# Patient Record
Sex: MALE | Race: WHITE | HISPANIC OR LATINO | Employment: UNEMPLOYED | URBAN - METROPOLITAN AREA
[De-identification: names, ages, dates, MRNs, and addresses within clinical notes are randomized per-mention and may not be internally consistent; named-entity substitution may affect disease eponyms.]

---

## 2017-02-28 ENCOUNTER — ALLSCRIPTS OFFICE VISIT (OUTPATIENT)
Dept: OTHER | Facility: OTHER | Age: 42
End: 2017-02-28

## 2017-02-28 DIAGNOSIS — E78.2 MIXED HYPERLIPIDEMIA: ICD-10-CM

## 2017-03-04 ENCOUNTER — APPOINTMENT (OUTPATIENT)
Dept: LAB | Facility: OTHER | Age: 42
End: 2017-03-04
Payer: COMMERCIAL

## 2017-03-04 ENCOUNTER — TRANSCRIBE ORDERS (OUTPATIENT)
Dept: LAB | Facility: OTHER | Age: 42
End: 2017-03-04

## 2017-03-04 DIAGNOSIS — E78.2 MIXED HYPERLIPIDEMIA: ICD-10-CM

## 2017-03-04 LAB
ALBUMIN SERPL BCP-MCNC: 3.6 G/DL (ref 3.5–5)
ALP SERPL-CCNC: 76 U/L (ref 46–116)
ALT SERPL W P-5'-P-CCNC: 51 U/L (ref 12–78)
ANION GAP SERPL CALCULATED.3IONS-SCNC: 7 MMOL/L (ref 4–13)
AST SERPL W P-5'-P-CCNC: 18 U/L (ref 5–45)
BILIRUB SERPL-MCNC: 0.51 MG/DL (ref 0.2–1)
BUN SERPL-MCNC: 13 MG/DL (ref 5–25)
CALCIUM SERPL-MCNC: 8.6 MG/DL (ref 8.3–10.1)
CHLORIDE SERPL-SCNC: 107 MMOL/L (ref 100–108)
CHOLEST SERPL-MCNC: 177 MG/DL (ref 50–200)
CO2 SERPL-SCNC: 29 MMOL/L (ref 21–32)
CREAT SERPL-MCNC: 0.9 MG/DL (ref 0.6–1.3)
GFR SERPL CREATININE-BSD FRML MDRD: >60 ML/MIN/1.73SQ M
GLUCOSE SERPL-MCNC: 101 MG/DL (ref 65–140)
HDLC SERPL-MCNC: 48 MG/DL (ref 40–60)
LDLC SERPL CALC-MCNC: 96 MG/DL (ref 0–100)
POTASSIUM SERPL-SCNC: 4.1 MMOL/L (ref 3.5–5.3)
PROT SERPL-MCNC: 7 G/DL (ref 6.4–8.2)
SODIUM SERPL-SCNC: 143 MMOL/L (ref 136–145)
TRIGL SERPL-MCNC: 166 MG/DL

## 2017-03-04 PROCEDURE — 80061 LIPID PANEL: CPT

## 2017-03-04 PROCEDURE — 80053 COMPREHEN METABOLIC PANEL: CPT

## 2017-03-04 PROCEDURE — 36415 COLL VENOUS BLD VENIPUNCTURE: CPT

## 2017-03-06 ENCOUNTER — GENERIC CONVERSION - ENCOUNTER (OUTPATIENT)
Dept: OTHER | Facility: OTHER | Age: 42
End: 2017-03-06

## 2017-05-12 ENCOUNTER — ALLSCRIPTS OFFICE VISIT (OUTPATIENT)
Dept: OTHER | Facility: OTHER | Age: 42
End: 2017-05-12

## 2017-10-17 DIAGNOSIS — E78.2 MIXED HYPERLIPIDEMIA: ICD-10-CM

## 2017-10-17 DIAGNOSIS — E78.00 PURE HYPERCHOLESTEROLEMIA: ICD-10-CM

## 2017-11-04 ENCOUNTER — TRANSCRIBE ORDERS (OUTPATIENT)
Dept: LAB | Facility: OTHER | Age: 42
End: 2017-11-04

## 2017-11-04 ENCOUNTER — APPOINTMENT (OUTPATIENT)
Dept: LAB | Facility: OTHER | Age: 42
End: 2017-11-04
Payer: COMMERCIAL

## 2017-11-04 DIAGNOSIS — E78.2 MIXED HYPERLIPIDEMIA: ICD-10-CM

## 2017-11-04 LAB
ALBUMIN SERPL BCP-MCNC: 3.7 G/DL (ref 3.5–5)
ALP SERPL-CCNC: 70 U/L (ref 46–116)
ALT SERPL W P-5'-P-CCNC: 47 U/L (ref 12–78)
ANION GAP SERPL CALCULATED.3IONS-SCNC: 5 MMOL/L (ref 4–13)
AST SERPL W P-5'-P-CCNC: 20 U/L (ref 5–45)
BILIRUB SERPL-MCNC: 0.4 MG/DL (ref 0.2–1)
BUN SERPL-MCNC: 16 MG/DL (ref 5–25)
CALCIUM SERPL-MCNC: 8.7 MG/DL (ref 8.3–10.1)
CHLORIDE SERPL-SCNC: 107 MMOL/L (ref 100–108)
CHOLEST SERPL-MCNC: 155 MG/DL (ref 50–200)
CO2 SERPL-SCNC: 29 MMOL/L (ref 21–32)
CREAT SERPL-MCNC: 0.99 MG/DL (ref 0.6–1.3)
GFR SERPL CREATININE-BSD FRML MDRD: 94 ML/MIN/1.73SQ M
GLUCOSE P FAST SERPL-MCNC: 101 MG/DL (ref 65–99)
HDLC SERPL-MCNC: 42 MG/DL (ref 40–60)
LDLC SERPL CALC-MCNC: 85 MG/DL (ref 0–100)
POTASSIUM SERPL-SCNC: 4.2 MMOL/L (ref 3.5–5.3)
PROT SERPL-MCNC: 6.9 G/DL (ref 6.4–8.2)
SODIUM SERPL-SCNC: 141 MMOL/L (ref 136–145)
TRIGL SERPL-MCNC: 141 MG/DL

## 2017-11-04 PROCEDURE — 80053 COMPREHEN METABOLIC PANEL: CPT

## 2017-11-04 PROCEDURE — 80061 LIPID PANEL: CPT

## 2017-11-04 PROCEDURE — 36415 COLL VENOUS BLD VENIPUNCTURE: CPT

## 2017-11-14 ENCOUNTER — ALLSCRIPTS OFFICE VISIT (OUTPATIENT)
Dept: OTHER | Facility: OTHER | Age: 42
End: 2017-11-14
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
## 2018-01-11 NOTE — RESULT NOTES
Verified Results  (1) COMPREHENSIVE METABOLIC PANEL 62XAK1045 93:15KY Manoj Jasso Order Number: [id number]     Test Name Result Flag Reference   GLUCOSE,RANDM 101 mg/dL     If the patient is fasting, the ADA then defines impaired fasting glucose as > 100 mg/dL and diabetes as > or equal to 123 mg/dL  SODIUM 143 mmol/L  136-145   POTASSIUM 4 1 mmol/L  3 5-5 3   CHLORIDE 107 mmol/L  100-108   CARBON DIOXIDE 29 mmol/L  21-32   ANION GAP (CALC) 7 mmol/L  4-13   BLOOD UREA NITROGEN 13 mg/dL  5-25   CREATININE 0 90 mg/dL  0 60-1 30   Standardized to IDMS reference method   CALCIUM 8 6 mg/dL  8 3-10 1   BILI, TOTAL 0 51 mg/dL  0 20-1 00   ALK PHOSPHATAS 76 U/L     ALT (SGPT) 51 U/L  12-78   AST(SGOT) 18 U/L  5-45   ALBUMIN 3 6 g/dL  3 5-5 0   TOTAL PROTEIN 7 0 g/dL  6 4-8 2   eGFR Non-African American      >60 0 ml/min/1 73sq m   - Patient Instructions: This is a fasting blood test  Water,black tea or black  coffee only after 9:00pm the night before test Drink 2 glasses of water the morning of test   National Kidney Disease Education Program recommendations are as follows:  GFR calculation is accurate only with a steady state creatinine  Chronic Kidney disease less than 60 ml/min/1 73 sq  meters  Kidney failure less than 15 ml/min/1 73 sq  meters  (1) LIPID PANEL, FASTING 87ECH0982 07:30AM Manoj Jasso Order Number: [id number]     Test Name Result Flag Reference   CHOLESTEROL 177 mg/dL     HDL,DIRECT 48 mg/dL  40-60   Specimen collection should occur prior to Metamizole administration due to the potential for falsely depressed results  LDL CHOLESTEROL CALCULATED 96 mg/dL  0-100   - Patient Instructions: This is a fasting blood test  Water,black tea or black  coffee only after 9:00pm the night before test   Drink 2 glasses of water the morning of test     - Patient Instructions:  This is a fasting blood test  Water,black tea or black  coffee only after 9:00pm the night before test Drink 2 glasses of water the morning of test   Triglyceride:         Normal              <150 mg/dl       Borderline High    150-199 mg/dl       High               200-499 mg/dl       Very High          >499 mg/dl  Cholesterol:         Desirable        <200 mg/dl      Borderline High  200-239 mg/dl      High             >239 mg/dl  HDL Cholesterol:        High    >59 mg/dL      Low     <41 mg/dL  LDL CALCULATED:    This screening LDL is a calculated result  It does not have the accuracy of the Direct Measured LDL in the monitoring of patients with hyperlipidemia and/or statin therapy  Direct Measure LDL (TCM530) must be ordered separately in these patients  TRIGLYCERIDES 166 mg/dL H <=150   Specimen collection should occur prior to N-Acetylcysteine or Metamizole administration due to the potential for falsely depressed results
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
## 2018-01-13 VITALS
BODY MASS INDEX: 34.21 KG/M2 | SYSTOLIC BLOOD PRESSURE: 112 MMHG | HEART RATE: 91 BPM | WEIGHT: 218 LBS | OXYGEN SATURATION: 98 % | DIASTOLIC BLOOD PRESSURE: 80 MMHG | HEIGHT: 67 IN

## 2018-01-13 VITALS
OXYGEN SATURATION: 96 % | WEIGHT: 215 LBS | SYSTOLIC BLOOD PRESSURE: 120 MMHG | HEIGHT: 67 IN | DIASTOLIC BLOOD PRESSURE: 86 MMHG | BODY MASS INDEX: 33.74 KG/M2 | HEART RATE: 98 BPM

## 2018-01-13 VITALS
HEART RATE: 93 BPM | OXYGEN SATURATION: 98 % | HEIGHT: 67 IN | BODY MASS INDEX: 33.43 KG/M2 | DIASTOLIC BLOOD PRESSURE: 86 MMHG | SYSTOLIC BLOOD PRESSURE: 122 MMHG | WEIGHT: 213 LBS

## 2018-01-13 NOTE — RESULT NOTES
Message   call, elevated triglycerides, low fat low cholesterol diet     Verified Results  (1) COMPREHENSIVE METABOLIC PANEL [id number] 89:99TR Elena Martinez Order Number: [id number]      National Kidney Disease Education Program recommendations are as follows:  GFR calculation is accurate only with a steady state creatinine  Chronic Kidney disease less than 60 ml/min/1 73 sq  meters  Kidney failure less than 15 ml/min/1 73 sq  meters  Test Name Result Flag Reference   GLUCOSE,RANDM 95 mg/dL     If the patient is fasting, the ADA then defines impaired fasting glucose as > 100 mg/dL and diabetes as > or equal to 123 mg/dL     SODIUM 141 mmol/L  136-145   POTASSIUM 4 3 mmol/L  3 5-5 3   CHLORIDE 108 mmol/L  100-108   CARBON DIOXIDE 26 mmol/L  21-32   ANION GAP (CALC) 7 mmol/L  4-13   BLOOD UREA NITROGEN 16 mg/dL  5-25   CREATININE 0 94 mg/dL  0 60-1 30   Standardized to IDMS reference method   CALCIUM 8 7 mg/dL  8 3-10 1   BILI, TOTAL 0 34 mg/dL  0 20-1 00   ALK PHOSPHATAS 80 U/L     ALT (SGPT) 47 U/L  12-78   AST(SGOT) 18 U/L  5-45   ALBUMIN 3 8 g/dL  3 5-5 0   TOTAL PROTEIN 7 1 g/dL  6 4-8 2   eGFR Non-African American      >60 0 ml/min/1 73sq m     (1) LIPID PANEL, FASTING 09Apr2016 09:40AM Elena Martinez Order Number: [id number]      Triglyceride:         Normal              <150 mg/dl       Borderline High    150-199 mg/dl       High               200-499 mg/dl       Very High          >499 mg/dl  Cholesterol:         Desirable        <200 mg/dl      Borderline High  200-239 mg/dl      High             >239 mg/dl  HDL Cholesterol:        High    >59 mg/dL      Low     <41 mg/dL     Test Name Result Flag Reference   CHOLESTEROL 184 mg/dL     HDL,DIRECT 40 mg/dL  40-60   LDL CHOLESTEROL CALCULATED 99 mg/dL  0-100   TRIGLYCERIDES 223 mg/dL H <=150

## 2018-01-15 NOTE — MISCELLANEOUS
Dear Danette Murphy : We missed you for your originally scheduled appointment for a  test ordered by Dr Mona Paulino  Please call at your earliest convenience to reschedule this appointment      Sincerely,  Bill Ramsay       Extension: [phone]     cc:  Dr Mona Paulino      Electronically signed by:Hazel Lindo   Mar  3 2016  5:44PM EST Co-author

## 2018-01-27 DIAGNOSIS — E78.2 MIXED HYPERLIPIDEMIA: Primary | ICD-10-CM

## 2018-01-28 RX ORDER — ATORVASTATIN CALCIUM 10 MG/1
TABLET, FILM COATED ORAL
Qty: 90 TABLET | Refills: 1 | Status: SHIPPED | OUTPATIENT
Start: 2018-01-28 | End: 2018-01-30 | Stop reason: SDUPTHER

## 2018-01-30 DIAGNOSIS — E78.2 MIXED HYPERLIPIDEMIA: ICD-10-CM

## 2018-01-30 RX ORDER — ATORVASTATIN CALCIUM 10 MG/1
TABLET, FILM COATED ORAL
Qty: 90 TABLET | Refills: 1 | Status: SHIPPED | OUTPATIENT
Start: 2018-01-30 | End: 2018-01-31 | Stop reason: SDUPTHER

## 2018-01-31 ENCOUNTER — TELEPHONE (OUTPATIENT)
Dept: FAMILY MEDICINE CLINIC | Facility: CLINIC | Age: 43
End: 2018-01-31

## 2018-01-31 DIAGNOSIS — E78.2 MIXED HYPERLIPIDEMIA: ICD-10-CM

## 2018-01-31 RX ORDER — ATORVASTATIN CALCIUM 10 MG/1
10 TABLET, FILM COATED ORAL DAILY
Qty: 90 TABLET | Refills: 3 | Status: SHIPPED | OUTPATIENT
Start: 2018-01-31 | End: 2019-08-16 | Stop reason: SDUPTHER

## 2018-02-08 ENCOUNTER — TRANSCRIBE ORDERS (OUTPATIENT)
Dept: MRI IMAGING | Facility: CLINIC | Age: 43
End: 2018-02-08

## 2018-02-08 DIAGNOSIS — J32.0 CHRONIC MAXILLARY SINUSITIS: Primary | ICD-10-CM

## 2018-02-08 DIAGNOSIS — J34.2 DEVIATED NASAL SEPTUM: ICD-10-CM

## 2018-02-12 ENCOUNTER — HOSPITAL ENCOUNTER (OUTPATIENT)
Dept: CT IMAGING | Facility: CLINIC | Age: 43
Discharge: HOME | End: 2018-02-12
Payer: COMMERCIAL

## 2018-02-12 DIAGNOSIS — J34.2 DEVIATED NASAL SEPTUM: ICD-10-CM

## 2018-02-12 DIAGNOSIS — J32.0 CHRONIC MAXILLARY SINUSITIS: ICD-10-CM

## 2018-02-12 PROCEDURE — 70486 CT MAXILLOFACIAL W/O DYE: CPT

## 2018-09-19 ENCOUNTER — TELEPHONE (OUTPATIENT)
Dept: FAMILY MEDICINE CLINIC | Facility: CLINIC | Age: 43
End: 2018-09-19

## 2018-09-19 DIAGNOSIS — K21.9 GASTROESOPHAGEAL REFLUX DISEASE WITHOUT ESOPHAGITIS: Primary | ICD-10-CM

## 2018-09-19 RX ORDER — OMEPRAZOLE 40 MG/1
40 CAPSULE, DELAYED RELEASE ORAL DAILY
Qty: 90 CAPSULE | Refills: 1 | Status: SHIPPED | OUTPATIENT
Start: 2018-09-19 | End: 2019-07-28 | Stop reason: SDUPTHER

## 2018-09-19 RX ORDER — OMEPRAZOLE 40 MG/1
1 CAPSULE, DELAYED RELEASE ORAL DAILY
COMMUNITY
Start: 2014-11-30 | End: 2018-09-19 | Stop reason: SDUPTHER

## 2018-10-06 ENCOUNTER — OFFICE VISIT (OUTPATIENT)
Dept: URGENT CARE | Facility: CLINIC | Age: 43
End: 2018-10-06
Payer: COMMERCIAL

## 2018-10-06 VITALS
HEIGHT: 67 IN | HEART RATE: 89 BPM | DIASTOLIC BLOOD PRESSURE: 74 MMHG | WEIGHT: 228 LBS | SYSTOLIC BLOOD PRESSURE: 118 MMHG | BODY MASS INDEX: 35.79 KG/M2 | TEMPERATURE: 98.7 F | RESPIRATION RATE: 18 BRPM | OXYGEN SATURATION: 98 %

## 2018-10-06 DIAGNOSIS — J01.01 ACUTE RECURRENT MAXILLARY SINUSITIS: Primary | ICD-10-CM

## 2018-10-06 PROCEDURE — G0382 LEV 3 HOSP TYPE B ED VISIT: HCPCS | Performed by: PHYSICIAN ASSISTANT

## 2018-10-06 RX ORDER — AMOXICILLIN AND CLAVULANATE POTASSIUM 875; 125 MG/1; MG/1
1 TABLET, FILM COATED ORAL EVERY 12 HOURS SCHEDULED
Qty: 20 TABLET | Refills: 0 | Status: SHIPPED | OUTPATIENT
Start: 2018-10-06 | End: 2018-10-16

## 2018-10-06 NOTE — PATIENT INSTRUCTIONS
Augmentin prescription sent to pharmacy-use as directed  If you developed a rash, tongue or throat swelling immediately start medication and proceed to the emergency room in severe cases  Pseudoephedrine as needed for congestion  Tylenol/ibuprofen as needed for fever or pain  Follow up with PCP in 3-5 days  Proceed to  ER if symptoms worsen  Sinusitis   AMBULATORY CARE:   Sinusitis  is inflammation or infection of your sinuses  It is most often caused by a virus  Acute sinusitis may last up to 12 weeks  Chronic sinusitis lasts longer than 12 weeks  Recurrent sinusitis means you have 4 or more times in 1 year  Common symptoms include the following:   · Fever    · Pain, pressure, redness, or swelling around the forehead, cheeks, or eyes    · Thick yellow or green discharge from your nose    · Tenderness when you touch your face over your sinuses    · Dry cough that happens mostly at night or when you lie down    · Headache and face pain that is worse when you lean forward    · Tooth pain, or pain when you chew  Seek care immediately if:   · Your eye and eyelid are red, swollen, and painful  · You cannot open your eye  · You have vision changes, such as double vision  · Your eyeball bulges out or you cannot move your eye  · You are more sleepy than normal, or you notice changes in your ability to think, move, or talk  · You have a stiff neck, a fever, or a bad headache  · You have swelling of your forehead or scalp  Contact your healthcare provider if:   · Your symptoms do not improve after 3 days  · Your symptoms do not go away after 10 days  · You have nausea and are vomiting  · Your nose is bleeding  · You have questions or concerns about your condition or care  Treatment for sinusitis:  Your symptoms may go away on their own  Your healthcare provider may recommend watchful waiting for up to 10 days before starting antibiotics   You may  need any of the following:  · Acetaminophen  decreases pain and fever  It is available without a doctor's order  Ask how much to take and how often to take it  Follow directions  Read the labels of all other medicines you are using to see if they also contain acetaminophen, or ask your doctor or pharmacist  Acetaminophen can cause liver damage if not taken correctly  Do not use more than 4 grams (4,000 milligrams) total of acetaminophen in one day  · NSAIDs , such as ibuprofen, help decrease swelling, pain, and fever  This medicine is available with or without a doctor's order  NSAIDs can cause stomach bleeding or kidney problems in certain people  If you take blood thinner medicine, always ask your healthcare provider if NSAIDs are safe for you  Always read the medicine label and follow directions  · Nasal steroid sprays  may help decrease inflammation in your nose and sinuses  · Decongestants  help reduce swelling and drain mucus in the nose and sinuses  They may help you breathe easier  · Antihistamines  help dry mucus in the nose and relieve sneezing  · Antibiotics  help treat or prevent a bacterial infection  · Take your medicine as directed  Contact your healthcare provider if you think your medicine is not helping or if you have side effects  Tell him or her if you are allergic to any medicine  Keep a list of the medicines, vitamins, and herbs you take  Include the amounts, and when and why you take them  Bring the list or the pill bottles to follow-up visits  Carry your medicine list with you in case of an emergency  Self-care:   · Rinse your sinuses  Use a sinus rinse device to rinse your nasal passages with a saline (salt water) solution or distilled water  Do not use tap water  This will help thin the mucus in your nose and rinse away pollen and dirt  It will also help reduce swelling so you can breathe normally  Ask your healthcare provider how often to do this       · Breathe in steam   Heat a bowl of water until you see steam  Lean over the bowl and make a tent over your head with a large towel  Breathe deeply for about 20 minutes  Be careful not to get too close to the steam or burn yourself  Do this 3 times a day  You can also breathe deeply when you take a hot shower  · Sleep with your head elevated  Place an extra pillow under your head before you go to sleep to help your sinuses drain  · Drink liquids as directed  Ask your healthcare provider how much liquid to drink each day and which liquids are best for you  Liquids will thin the mucus in your nose and help it drain  Avoid drinks that contain alcohol or caffeine  · Do not smoke, and avoid secondhand smoke  Nicotine and other chemicals in cigarettes and cigars can make your symptoms worse  Ask your healthcare provider for information if you currently smoke and need help to quit  E-cigarettes or smokeless tobacco still contain nicotine  Talk to your healthcare provider before you use these products  Prevent the spread of germs that cause sinusitis:  Wash your hands often with soap and water  Wash your hands after you use the bathroom, change a child's diaper, or sneeze  Wash your hands before you prepare or eat food  Follow up with your healthcare provider as directed: You may be referred to an ear, nose, and throat specialist  Write down your questions so you remember to ask them during your visits  © 2017 [address] Information is for End User's use only and may not be sold, redistributed or otherwise used for commercial purposes  All illustrations and images included in CareNotes® are the copyrighted property of [employer]  or [employer]  The above information is an  only  It is not intended as medical advice for individual conditions or treatments  Talk to your doctor, nurse or pharmacist before following any medical regimen to see if it is safe and effective for you
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
## 2018-11-23 ENCOUNTER — OFFICE VISIT (OUTPATIENT)
Dept: URGENT CARE | Facility: CLINIC | Age: 43
End: 2018-11-23
Payer: COMMERCIAL

## 2018-11-23 VITALS
TEMPERATURE: 98 F | OXYGEN SATURATION: 97 % | DIASTOLIC BLOOD PRESSURE: 86 MMHG | SYSTOLIC BLOOD PRESSURE: 118 MMHG | BODY MASS INDEX: 35.16 KG/M2 | RESPIRATION RATE: 16 BRPM | WEIGHT: 224 LBS | HEIGHT: 67 IN | HEART RATE: 94 BPM

## 2018-11-23 DIAGNOSIS — J01.00 ACUTE NON-RECURRENT MAXILLARY SINUSITIS: Primary | ICD-10-CM

## 2018-11-23 PROCEDURE — 99283 EMERGENCY DEPT VISIT LOW MDM: CPT | Performed by: PHYSICIAN ASSISTANT

## 2018-11-23 PROCEDURE — G0382 LEV 3 HOSP TYPE B ED VISIT: HCPCS | Performed by: PHYSICIAN ASSISTANT

## 2018-11-23 RX ORDER — AMOXICILLIN AND CLAVULANATE POTASSIUM 500; 125 MG/1; MG/1
1 TABLET, FILM COATED ORAL EVERY 8 HOURS SCHEDULED
Qty: 30 TABLET | Refills: 0 | Status: SHIPPED | OUTPATIENT
Start: 2018-11-23 | End: 2018-12-03

## 2018-11-23 NOTE — PATIENT INSTRUCTIONS
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
## 2018-12-27 ENCOUNTER — OFFICE VISIT (OUTPATIENT)
Dept: FAMILY MEDICINE CLINIC | Facility: CLINIC | Age: 43
End: 2018-12-27
Payer: COMMERCIAL

## 2018-12-27 VITALS
RESPIRATION RATE: 18 BRPM | BODY MASS INDEX: 35 KG/M2 | WEIGHT: 223 LBS | SYSTOLIC BLOOD PRESSURE: 124 MMHG | HEIGHT: 67 IN | HEART RATE: 105 BPM | TEMPERATURE: 97.9 F | OXYGEN SATURATION: 95 % | DIASTOLIC BLOOD PRESSURE: 86 MMHG

## 2018-12-27 DIAGNOSIS — E66.09 CLASS 1 OBESITY DUE TO EXCESS CALORIES WITHOUT SERIOUS COMORBIDITY WITH BODY MASS INDEX (BMI) OF 34.0 TO 34.9 IN ADULT: ICD-10-CM

## 2018-12-27 DIAGNOSIS — E66.9 OBESITY (BMI 30.0-34.9): ICD-10-CM

## 2018-12-27 DIAGNOSIS — R53.83 FATIGUE, UNSPECIFIED TYPE: ICD-10-CM

## 2018-12-27 DIAGNOSIS — E78.2 MIXED HYPERLIPIDEMIA: Primary | ICD-10-CM

## 2018-12-27 DIAGNOSIS — Z87.891 QUIT SMOKING: ICD-10-CM

## 2018-12-27 DIAGNOSIS — Z23 NEED FOR INFLUENZA VACCINATION: ICD-10-CM

## 2018-12-27 DIAGNOSIS — Z00.00 ANNUAL PHYSICAL EXAM: ICD-10-CM

## 2018-12-27 DIAGNOSIS — F17.200 TOBACCO DEPENDENCE: ICD-10-CM

## 2018-12-27 PROBLEM — E66.811 CLASS 1 OBESITY DUE TO EXCESS CALORIES WITHOUT SERIOUS COMORBIDITY WITH BODY MASS INDEX (BMI) OF 34.0 TO 34.9 IN ADULT: Status: ACTIVE | Noted: 2018-12-27

## 2018-12-27 PROBLEM — E66.811 OBESITY (BMI 30.0-34.9): Status: ACTIVE | Noted: 2018-12-27

## 2018-12-27 PROCEDURE — 99396 PREV VISIT EST AGE 40-64: CPT | Performed by: NURSE PRACTITIONER

## 2018-12-27 PROCEDURE — 90682 RIV4 VACC RECOMBINANT DNA IM: CPT

## 2018-12-27 PROCEDURE — 90471 IMMUNIZATION ADMIN: CPT

## 2018-12-27 RX ORDER — PHENTERMINE HYDROCHLORIDE 15 MG/1
15 CAPSULE ORAL EVERY MORNING
Qty: 30 CAPSULE | Refills: 0 | Status: SHIPPED | OUTPATIENT
Start: 2018-12-27 | End: 2019-10-26

## 2018-12-27 NOTE — PATIENT INSTRUCTIONS
Obtain fasting labs, No food after midnight may drink water  Take 1 phentermine pill  Daily  Keep food diary, limit calories to 1800 per   Use nicotine patch daily  Follow up in  1 mth  Labs ordered will review  At follow up  Stop smoking while taking Phentermine  Increase exercise    Wellness Visit for Adults   AMBULATORY CARE:   A wellness visit  is when you see your healthcare provider to get screened for health problems  You can also get advice on how to stay healthy  Write down your questions so you remember to ask them  Ask your healthcare provider how often you should have a wellness visit  What happens at a wellness visit:  Your healthcare provider will ask about your health, and your family history of health problems  This includes high blood pressure, heart disease, and cancer  He or she will ask if you have symptoms that concern you, if you smoke, and about your mood  You may also be asked about your intake of medicines, supplements, food, and alcohol  Any of the following may be done:  · Your weight  will be checked  Your height may also be checked so your body mass index (BMI) can be calculated  Your BMI shows if you are at a healthy weight  · Your blood pressure  and heart rate will be checked  Your temperature may also be checked  · Blood and urine tests  may be done  Blood tests may be done to check your cholesterol levels  Abnormal cholesterol levels increase your risk for heart disease and stroke  You may also need a blood or urine test to check for diabetes if you are at increased risk  Urine tests may be done to look for signs of an infection or kidney disease  · A physical exam  includes checking your heartbeat and lungs with a stethoscope  Your healthcare provider may also check your skin to look for sun damage  · Screening tests  may be recommended  A screening test is done to check for diseases that may not cause symptoms   The screening tests you may need depend on your age, gender, family history, and lifestyle habits  For example, colorectal screening may be recommended if you are 48years old or older  Screening tests you need if you are a woman:   · A Pap smear  is used to screen for cervical cancer  Pap smears are usually done every 3 to 5 years depending on your age  You may need them more often if you have had abnormal Pap smear test results in the past  Ask your healthcare provider how often you should have a Pap smear  · A mammogram  is an x-ray of your breasts to screen for breast cancer  Experts recommend mammograms every 2 years starting at age 48 years  You may need a mammogram at age 52 years or younger if you have an increased risk for breast cancer  Talk to your healthcare provider about when you should start having mammograms and how often you need them  Vaccines you may need:   · Get an influenza vaccine  every year  The influenza vaccine protects you from the flu  Several types of viruses cause the flu  The viruses change over time, so new vaccines are made each year  · Get a tetanus-diphtheria (Td) booster vaccine  every 10 years  This vaccine protects you against tetanus and diphtheria  Tetanus is a severe infection that may cause painful muscle spasms and lockjaw  Diphtheria is a severe bacterial infection that causes a thick covering in the back of your mouth and throat  · Get a human papillomavirus (HPV) vaccine  if you are female and aged 23 to 32 or male 23 to 24 and never received it  This vaccine protects you from HPV infection  HPV is the most common infection spread by sexual contact  HPV may also cause vaginal, penile, and anal cancers  · Get a pneumococcal vaccine  if you are aged 72 years or older  The pneumococcal vaccine is an injection given to protect you from pneumococcal disease  Pneumococcal disease is an infection caused by pneumococcal bacteria  The infection may cause pneumonia, meningitis, or an ear infection      · Get a shingles vaccine  if you are aged 61 or older, even if you have had shingles before  The shingles vaccine is an injection to protect you from the varicella-zoster virus  This is the same virus that causes chickenpox  Shingles is a painful rash that develops in people who had chickenpox or have been exposed to the virus  How to eat healthy:  My Plate is a model for planning healthy meals  It shows the types and amounts of foods that should go on your plate  Fruits and vegetables make up about half of your plate, and grains and protein make up the other half  A serving of dairy is included on the side of your plate  The amount of calories and serving sizes you need depends on your age, gender, weight, and height  Examples of healthy foods are listed below:  · Eat a variety of vegetables  such as dark green, red, and orange vegetables  You can also include canned vegetables low in sodium (salt) and frozen vegetables without added butter or sauces  · Eat a variety of fresh fruits , canned fruit in 100% juice, frozen fruit, and dried fruit  · Include whole grains  At least half of the grains you eat should be whole grains  Examples include whole-wheat bread, wheat pasta, brown rice, and whole-grain cereals such as oatmeal     · Eat a variety of protein foods such as seafood (fish and shellfish), lean meat, and poultry without skin (turkey and chicken)  Examples of lean meats include pork leg, shoulder, or tenderloin, and beef round, sirloin, tenderloin, and extra lean ground beef  Other protein foods include eggs and egg substitutes, beans, peas, soy products, nuts, and seeds  · Choose low-fat dairy products such as skim or 1% milk or low-fat yogurt, cheese, and cottage cheese  · Limit unhealthy fats  such as butter, hard margarine, and shortening  Exercise:  Exercise at least 30 minutes per day on most days of the week  Some examples of exercise include walking, biking, dancing, and swimming   You can also fit in more physical activity by taking the stairs instead of the elevator or parking farther away from stores  Include muscle strengthening activities 2 days each week  Regular exercise provides many health benefits  It helps you manage your weight, and decreases your risk for type 2 diabetes, heart disease, stroke, and high blood pressure  Exercise can also help improve your mood  Ask your healthcare provider about the best exercise plan for you  General health and safety guidelines:   · Do not smoke  Nicotine and other chemicals in cigarettes and cigars can cause lung damage  Ask your healthcare provider for information if you currently smoke and need help to quit  E-cigarettes or smokeless tobacco still contain nicotine  Talk to your healthcare provider before you use these products  · Limit alcohol  A drink of alcohol is 12 ounces of beer, 5 ounces of wine, or 1½ ounces of liquor  · Lose weight, if needed  Being overweight increases your risk of certain health conditions  These include heart disease, high blood pressure, type 2 diabetes, and certain types of cancer  · Protect your skin  Do not sunbathe or use tanning beds  Use sunscreen with a SPF 15 or higher  Apply sunscreen at least 15 minutes before you go outside  Reapply sunscreen every 2 hours  Wear protective clothing, hats, and sunglasses when you are outside  · Drive safely  Always wear your seatbelt  Make sure everyone in your car wears a seatbelt  A seatbelt can save your life if you are in an accident  Do not use your cell phone when you are driving  This could distract you and cause an accident  Pull over if you need to make a call or send a text message  · Practice safe sex  Use latex condoms if are sexually active and have more than one partner  Your healthcare provider may recommend screening tests for sexually transmitted infections (STIs)  · Wear helmets, lifejackets, and protective gear    Always wear a helmet when you ride a bike or motorcycle, go skiing, or play sports that could cause a head injury  Wear protective equipment when you play sports  Wear a lifejacket when you are on a boat or doing water sports  © 2017 [employer] Information is for End User's use only and may not be sold, redistributed or otherwise used for commercial purposes  All illustrations and images included in CareNotes® are the copyrighted property of [employer]  or [employer]  The above information is an  only  It is not intended as medical advice for individual conditions or treatments  Talk to your doctor, nurse or pharmacist before following any medical regimen to see if it is safe and effective for you  Weight Management   AMBULATORY CARE:   Why it is important to manage your weight:  Being overweight increases your risk of health conditions such as heart disease, high blood pressure, type 2 diabetes, and certain types of cancer  It can also increase your risk for osteoarthritis, sleep apnea, and other respiratory problems  Aim for a slow, steady weight loss  Even a small amount of weight loss can lower your risk of health problems  How to lose weight safely:  A safe and healthy way to lose weight is to eat fewer calories and get regular exercise  You can lose up about 1 pound a week by decreasing the number of calories you eat by 500 calories each day  You can decrease calories by eating smaller portion sizes or by cutting out high-calorie foods  Read labels to find out how many calories are in the foods you eat  You can also burn calories with exercise such as walking, swimming, or biking  You will be more likely to keep weight off if you make these changes part of your lifestyle  Healthy meal plan for weight management:  A healthy meal plan includes a variety of foods, contains fewer calories, and helps you stay healthy   A healthy meal plan includes the following:  · Eat whole-grain foods more often  A healthy meal plan should contain fiber  Fiber is the part of grains, fruits, and vegetables that is not broken down by your body  Whole-grain foods are healthy and provide extra fiber in your diet  Some examples of whole-grain foods are whole-wheat breads and pastas, oatmeal, brown rice, and bulgur  · Eat a variety of vegetables every day  Include dark, leafy greens such as spinach, kale, marie greens, and mustard greens  Eat yellow and orange vegetables such as carrots, sweet potatoes, and winter squash  · Eat a variety of fruits every day  Choose fresh or canned fruit (canned in its own juice or light syrup) instead of juice  Fruit juice has very little or no fiber  · Eat low-fat dairy foods  Drink fat-free (skim) milk or 1% milk  Eat fat-free yogurt and low-fat cottage cheese  Try low-fat cheeses such as mozzarella and other reduced-fat cheeses  · Choose meat and other protein foods that are low in fat  Choose beans or other legumes such as split peas or lentils  Choose fish, skinless poultry (chicken or turkey), or lean cuts of red meat (beef or pork)  Before you cook meat or poultry, cut off any visible fat  · Use less fat and oil  Try baking foods instead of frying them  Add less fat, such as margarine, sour cream, regular salad dressing and mayonnaise to foods  Eat fewer high-fat foods  Some examples of high-fat foods include french fries, doughnuts, ice cream, and cakes  · Eat fewer sweets  Limit foods and drinks that are high in sugar  This includes candy, cookies, regular soda, and sweetened drinks  Ways to decrease calories:   · Eat smaller portions  ¨ Use a small plate with smaller servings  ¨ Do not eat second helpings  ¨ When you eat at a restaurant, ask for a box and place half of your meal in the box before you eat  ¨ Share an entrée with someone else  · Replace high-calorie snacks with healthy, low-calorie snacks  ¨ Choose fresh fruit, vegetables, fat-free rice cakes, or air-popped popcorn instead of potato chips, nuts, or chocolate  ¨ Choose water or calorie-free drinks instead of soda or sweetened drinks  · Eat regular meals  Skipping meals can lead to overeating later in the day  Eat a healthy snack in place of a meal if you do not have time to eat a regular meal      · Do not shop for groceries when you are hungry  You may be more likely to make unhealthy food choices  Take a grocery list of healthy foods and shop after you have eaten  Exercise:  Exercise at least 30 minutes per day on most days of the week  Some examples of exercise include walking, biking, dancing, and swimming  You can also fit in more physical activity by taking the stairs instead of the elevator or parking farther away from stores  Ask your healthcare provider about the best exercise plan for you  Other things to consider as you try to lose weight:   · Be aware of situations that may give you the urge to overeat, such as eating while watching television  Find ways to avoid these situations  For example, read a book, go for a walk, or do crafts  · Meet with a weight loss support group or friends who are also trying to lose weight  This may help you stay motivated to continue working on your weight loss goals  © 2017 [employer] Information is for End User's use only and may not be sold, redistributed or otherwise used for commercial purposes  All illustrations and images included in CareNotes® are the copyrighted property of [employer]  or [employer]  The above information is an  only  It is not intended as medical advice for individual conditions or treatments  Talk to your doctor, nurse or pharmacist before following any medical regimen to see if it is safe and effective for you  Obesity   AMBULATORY CARE:   Obesity  is when your body mass index (BMI) is greater than 30   Your healthcare provider will use your height and weight to measure your BMI  The risks of obesity include  many health problems, such as injuries or physical disability  You may need tests to check for the following:  · Diabetes     · High blood pressure or high cholesterol     · Heart disease     · Gallbladder or liver disease     · Cancer of the colon, breast, prostate, liver, or kidney     · Sleep apnea     · Arthritis or gout  Seek care immediately if:   · You have a severe headache, confusion, or difficulty speaking  · You have weakness on one side of your body  · You have chest pain, sweating, or shortness of breath  Contact your healthcare provider if:   · You have symptoms of gallbladder or liver disease, such as pain in your upper abdomen  · You have knee or hip pain and discomfort while walking  · You have symptoms of diabetes, such as intense hunger and thirst, and frequent urination  · You have symptoms of sleep apnea, such as snoring or daytime sleepiness  · You have questions or concerns about your condition or care  Treatment for obesity  focuses on helping you lose weight to improve your health  Even a small decrease in BMI can reduce the risk for many health problems  Your healthcare provider will help you set a weight-loss goal   · Lifestyle changes  are the first step in treating obesity  These include making healthy food choices and getting regular physical activity  Your healthcare provider may suggest a weight-loss program that involves coaching, education, and therapy  · Medicine  may help you lose weight when it is used with a healthy diet and physical activity  · Surgery  can help you lose weight if you are very obese and have other health problems  There are several types of weight-loss surgery  Ask your healthcare provider for more information  Be successful losing weight:   · Set small, realistic goals    An example of a small goal is to walk for 20 minutes 5 days a week  Avinash goal is to lose 5% of your body weight  · Tell friends, family members, and coworkers about your goals  and ask for their support  Ask a friend to lose weight with you, or join a weight-loss support group  · Identify foods or triggers that may cause you to overeat , and find ways to avoid them  Remove tempting high-calorie foods from your home and workplace  Place a bowl of fresh fruit on your kitchen counter  If stress causes you to eat, then find other ways to cope with stress  · Keep a diary to track what you eat and drink  Also write down how many minutes of physical activity you do each day  Weigh yourself once a week and record it in your diary  Eating changes: You will need to eat 500 to 1,000 fewer calories each day than you currently eat to lose 1 to 2 pounds a week  The following changes will help you cut calories:  · Eat smaller portions  Use small plates, no larger than 9 inches in diameter  Fill your plate half full of fruits and vegetables  Measure your food using measuring cups until you know what a serving size looks like  · Eat 3 meals and 1 or 2 snacks each day  Plan your meals in advance  Helen Nubia and eat at home most of the time  Eat slowly  · Eat fruits and vegetables at every meal   They are low in calories and high in fiber, which makes you feel full  Do not add butter, margarine, or cream sauce to vegetables  Use herbs to season steamed vegetables  · Eat less fat and fewer fried foods  Eat more baked or grilled chicken and fish  These protein sources are lower in calories and fat than red meat  Limit fast food  Dress your salads with olive oil and vinegar instead of bottled dressing  · Limit the amount of sugar you eat  Do not drink sugary beverages  Limit alcohol  Activity changes:  Physical activity is good for your body in many ways  It helps you burn calories and build strong muscles   It decreases stress and depression, and improves your mood  It can also help you sleep better  Talk to your healthcare provider before you begin an exercise program   · Exercise for at least 30 minutes 5 days a week  Start slowly  Set aside time each day for physical activity that you enjoy and that is convenient for you  It is best to do both weight training and an activity that increases your heart rate, such as walking, bicycling, or swimming  · Find ways to be more active  Do yard work and housecleaning  Walk up the stairs instead of using elevators  Spend your leisure time going to events that require walking, such as outdoor festivals or fairs  This extra physical activity can help you lose weight and keep it off  Follow up with your healthcare provider as directed: You may need to meet with a dietitian  Write down your questions so you remember to ask them during your visits  © 2017 [facility] Information is for End User's use only and may not be sold, redistributed or otherwise used for commercial purposes  All illustrations and images included in CareNotes® are the copyrighted property of A D A M , Inc  or [employer]  The above information is an  only  It is not intended as medical advice for individual conditions or treatments  Talk to your doctor, nurse or pharmacist before following any medical regimen to see if it is safe and effective for you  Cigarette Smoking and Your Health   AMBULATORY CARE:   Risks to your health if you smoke:  Nicotine and other chemicals found in tobacco damage every cell in your body  Even if you are a light smoker, you have an increased risk for cancer, heart disease, and lung disease  If you are pregnant or have diabetes, smoking increases your risk for complications  Benefits to your health if you stop smoking:   · You decrease respiratory symptoms such as coughing, wheezing, and shortness of breath       · You reduce your risk for cancers of the lung, mouth, throat, kidney, bladder, pancreas, stomach, and cervix  If you already have cancer, you increase the benefits of chemotherapy  You also reduce your risk for cancer returning or a second cancer from developing  · You reduce your risk for heart disease, blood clots, heart attack, and stroke  · You reduce your risk for lung infections, and diseases such as pneumonia, asthma, chronic bronchitis, and emphysema  · Your circulation improves  More oxygen can be delivered to your body  If you have diabetes, you lower your risk for complications, such as kidney, artery, and eye diseases  You also lower your risk for nerve damage  Nerve damage can lead to amputations, poor vision, and blindness  · You improve your body's ability to heal and to fight infections  Benefits to the health of others if you stop smoking:  Tobacco is harmful to nonsmokers who breathe in your secondhand smoke  The following are ways the health of others around you may improve when you stop smoking:  · You lower the risks for lung cancer and heart disease in nonsmoking adults  · If you are pregnant, you lower the risk for miscarriage, early delivery, low birth weight, and stillbirth  You also lower your baby's risk for SIDS, obesity, developmental delay, and neurobehavioral problems, such as ADHD  · If you have children, you lower their risk for ear infections, colds, pneumonia, bronchitis, and asthma  For more information and support to stop smoking:   · Smokefree  gov  Phone: 6- 717 - 663-4835  Web Address: www smokefrCuretis  Follow up with your healthcare provider as directed:  Write down your questions so you remember to ask them during your visits  © 2017 [employer] Information is for End User's use only and may not be sold, redistributed or otherwise used for commercial purposes   All illustrations and images included in CareNotes® are the copyrighted property of A [employer] M [employer]  or [employer]  The above information is an  only  It is not intended as medical advice for individual conditions or treatments  Talk to your doctor, nurse or pharmacist before following any medical regimen to see if it is safe and effective for you  Cholesterol and Your Health   AMBULATORY CARE:   Cholesterol  is a waxy, fat-like substance  Cholesterol is made by your body, but also comes from certain foods you eat  Your body uses cholesterol to make hormones and new cells  Your body also uses cholesterol to protect nerves  Cholesterol comes from foods such as meat and dairy products  Your total cholesterol level is made up by LDL cholesterol, HDL cholesterol, and triglycerides:  · LDL cholesterol  is called bad cholesterol  because it forms plaque in your arteries  As plaque builds up, your arteries become narrow, and less blood flows through  When plaque decreases blood flow to your heart, you may have chest pain  If plaque completely blocks an artery that bring blood to your heart, you may have a heart attack  Plaque can break off and form blood clots  Blood clots may block arteries in your brain and cause a stroke  · HDL cholesterol  is called good cholesterol  because it helps remove LDL cholesterol from your arteries  It does this by attaching to LDL cholesterol and carrying it to your liver  Your liver breaks down LDL cholesterol so your body can get rid of it  High levels of HDL cholesterol can help prevent a heart attack and stroke  Low levels of HDL cholesterol can increase your risk for heart disease, heart attack, and stroke  · Triglycerides  are a type of fat that store energy from foods you eat  High levels of triglycerides also cause plaque buildup  This can increase your risk for a heart attack or stroke  If your triglyceride level is high, your LDL cholesterol level may also be high    How food affects your cholesterol levels:   · Unhealthy fats  increase LDL cholesterol and triglyceride levels in your blood  They are found in foods high in cholesterol, saturated fat, and trans fat:     ¨ Cholesterol  is found in eggs, dairy, and meat  ¨ Saturated fat  is found in butter, cheese, ice cream, whole milk, and coconut oil  Saturated fat is also found in meat, such as sausage, hot dogs, and bologna  ¨ Trans fat  is found in liquid oils and is used in fried and baked foods  Foods that contain trans fats include chips, crackers, muffins, sweet rolls, microwave popcorn, and cookies  · Healthy fats,  also called unsaturated fats, help lower LDL cholesterol and triglyceride levels  Healthy fats include the following:     ¨ Monounsaturated fats  are found in foods such as olive oil, canola oil, avocado, nuts, and olives  ¨ Polyunsaturated fats,  such as omega 3 fats, are found in fish, such as salmon, trout, and tuna  They can also be found in plant foods such as flaxseed, walnuts, and soybeans  Other things that affect your cholesterol levels:   · Smoking cigarettes    · Being overweight or obese     · Drinking large amounts of alcohol    · Not enough exercise or no exercise    · Certain genes passed from your parents to you  What you need to know about having your cholesterol levels checked: Adults 21to 39years of age should have their cholesterol levels checked every 4 to 6 years  Adults 45 years and older should have their cholesterol checked every 1 to 2 years  You may need your cholesterol checked more often, or at a younger age, if you have risk factors for heart disease  You may also need to have your cholesterol checked more often if you have other health conditions, such as diabetes  Blood tests are used to check cholesterol levels  Blood tests measure your levels of triglycerides, LDL cholesterol, and HDL cholesterol  Cholesterol level goals: Your cholesterol level goal may depend on your risk for heart disease   It may also depend on your age and other health conditions  Ask your healthcare provider if the following goals are right for you:  · Your total cholesterol level  should be less than 200 mg/dL  This number may also depend on your HDL and LDL cholesterol goals  · Your LDL cholesterol level  should be less than 130 mg/dL  · Your HDL cholesterol level  should be 60 mg/dL or higher  · Your triglyceride level  should be less than 150 mg/dL  Treatment for high cholesterol:  Treatment for high cholesterol will also decrease your risk of heart disease, heart attack, and stroke  Treatment may include any of the following:  · Medicines  may be given to lower your LDL cholesterol, triglyceride levels, or total cholesterol level  You may need medicines to lower your cholesterol if any of the following is true:     ¨ You have a history of stroke, TIA, unstable angina, or a heart attack    ¨ Your LDL cholesterol level is 190 mg/dL or higher    ¨ You are age 36to 76years of age, have diabetes, and your LDL cholesterol is 70 mg/dL or higher    ¨ You are age 36to 76years of age, have risk factors for heart disease, and your LDL cholesterol is 70 mg/dL or higher    · Lifestyle changes  include changes to your diet, exercise, weight loss, and quitting smoking  It also includes decreasing the amount of alcohol you drink  · Supplements  include fish oil, red yeast rice, and garlic  Fish oil may help lower your triglyceride and LDL cholesterol levels  It may also increase your HDL cholesterol level  Red yeast rice may help decrease your total cholesterol level and LDL cholesterol level  Garlic may help lower your total cholesterol level  Do not take these supplements without talking to your healthcare provider  Nutrition to help lower your cholesterol levels:  A registered dietitian can help you create a healthy eating plan  Read food labels and choose foods low in saturated fat, trans fats, and cholesterol  · Decrease the total amount of fat you eat    Choose lean meats, fat-free or 1% fat milk, and low-fat dairy products, such as yogurt and cheese  Try to limit or avoid red meats  Limit or do not eat fried foods or baked goods such as cookies  · Replace unhealthy fats with healthy fats  Cook foods in olive oil or canola oil  Choose soft margarines that are low in saturated fat and trans fat  Seeds, nuts, and avocados are other examples of healthy fats  · Eat foods with omega-3 fats  Examples include salmon, tuna, mackerel, walnuts, and flaxseed  Eat fish 2 times per week  Children and pregnant women should not eat fish that have high levels of mercury, such as shark, swordfish, and ubaldo mackerel  · Increase the amount of plant-based foods you eat  Plant-based foods are low in cholesterol and fat  Eating more of these foods may help lower your cholesterol and help you lose weight  Examples of plant-based foods includes fruits, vegetables, legumes, and whole grains  Replace milk that contains dairy with almond, soy, or coconut milk  Eat beans and foods with soy for protein instead of meat  Ask your healthcare provider or dietitian for more information on plant-based foods  · Increase the amount of fiber you eat  High-fiber foods can help lower your LDL cholesterol  You should eat between 20 and 30 grams of fiber each day  Eat at least 5 servings of fruits and vegetables each day  Other examples of high-fiber foods include whole-grain or whole-wheat breads, pastas, or cereals, and brown rice  Eat 3 ounces of whole-grain foods each day  Increase fiber slowly  You may have abdominal discomfort, bloating, and gas if you add fiber to your diet too quickly  Lifestyle changes you can make to help lower your cholesterol levels:   · Maintain a healthy weight  Ask your healthcare provider how much you should weigh  Ask him or her to help you create a weight loss plan if you are overweight  Weight loss can decrease your total cholesterol and triglyceride levels  · Exercise regularly  Exercise can help lower your total cholesterol level and maintain a healthy weight  Exercise can also help increase your HDL cholesterol level  Work with your healthcare provider to create an exercise program that is right for you  Get at least 30 minutes of moderate exercise most days of the week  Examples of exercise include brisk walking, swimming, or biking  · Do not smoke  Nicotine and other chemicals in cigarettes and cigars can damage your lungs, heart, and blood vessels  They can also raise your triglyceride levels  Ask your healthcare provider for information if you currently smoke and need help to quit  E-cigarettes or smokeless tobacco still contain nicotine  Talk to your healthcare provider before you use these products  · Limit or do not drink alcohol  Alcohol can increase your triglyceride levels  Ask your healthcare provider if it is safe for you to drink alcohol  Also ask how much is safe for you to drink each day  © 2017 [employer] Information is for End User's use only and may not be sold, redistributed or otherwise used for commercial purposes  All illustrations and images included in CareNotes® are the copyrighted property of [employer] , [employer]  or [employer]  The above information is an  only  It is not intended as medical advice for individual conditions or treatments  Talk to your doctor, nurse or pharmacist before following any medical regimen to see if it is safe and effective for you

## 2018-12-27 NOTE — PROGRESS NOTES
Assessment/Plan:    Obesity (BMI 30 0-34  9)  Take 1 phentermine pill  Daily  Keep food diary, limit calories to 1800 per   Stop smoking while on Phentermine    Annual physical exam  Labs order  Weight management consult done  Smoking cessation education and encouragement provided          Problem List Items Addressed This Visit     Mixed hyperlipidemia - Primary    Relevant Orders    Lipid panel    Comprehensive metabolic panel    Class 1 obesity due to excess calories without serious comorbidity with body mass index (BMI) of 34 0 to 34 9 in adult    Quit smoking    Relevant Medications    nicotine (NICODERM CQ) 7 mg/24hr TD 24 hr patch    Annual physical exam     Labs order  Weight management consult done  Smoking cessation education and encouragement provided          Obesity (BMI 30 0-34  9)     Take 1 phentermine pill  Daily  Keep food diary, limit calories to 1800 per   Stop smoking while on Phentermine         Relevant Medications    phentermine 15 MG capsule      Other Visit Diagnoses     Fatigue, unspecified type        Relevant Orders    CBC and differential    TSH, 3rd generation    UA (URINE) with reflex to Microscopic    Tobacco dependence        Relevant Medications    nicotine (NICODERM CQ) 7 mg/24hr TD 24 hr patch            Subjective:      Patient ID: Manju Robertson is a 37 y o  male  Pt is here for a physical and would like to discuss smoking cessation and weight loss options  Goal weight - under 200lbs         The following portions of the patient's history were reviewed and updated as appropriate: allergies, current medications, past family history, past medical history, past social history, past surgical history and problem list     Review of Systems   Constitutional: Negative  HENT: Positive for sinus pain  Eyes: Negative  Respiratory: Negative  Cardiovascular: Negative  Gastrointestinal: Negative  Endocrine: Negative  Genitourinary: Negative  Musculoskeletal: Negative  Skin: Negative  Allergic/Immunologic: Negative  Neurological: Negative  Hematological: Negative  Psychiatric/Behavioral: Negative  Objective:      /86   Pulse 105   Temp 97 9 °F (36 6 °C)   Resp 18   Ht 5' 7" (1 702 m)   Wt 101 kg (223 lb)   SpO2 95%   BMI 34 93 kg/m²          Physical Exam   Constitutional: He is oriented to person, place, and time  He appears well-developed  HENT:   Head: Normocephalic  Neck: Normal range of motion  Cardiovascular: Normal rate and regular rhythm  Pulmonary/Chest: Effort normal and breath sounds normal    Abdominal: Soft  Musculoskeletal: Normal range of motion  Neurological: He is alert and oriented to person, place, and time  Skin: Skin is warm and dry  Nursing note and vitals reviewed  BMI Counseling: Body mass index is 34 93 kg/m²  Discussed the patient's BMI with him  The BMI is above average  BMI counseling and education was provided to the patient  Nutrition recommendations include reducing portion sizes, decreasing overall calorie intake, 3-5 servings of fruits/vegetables daily, reducing fast food intake, consuming healthier snacks, decreasing soda and/or juice intake, moderation in carbohydrate intake, increasing intake of lean protein, reducing intake of saturated fat and trans fat and reducing intake of cholesterol  Exercise recommendations include moderate aerobic physical activity for 150 minutes/week, vigorous aerobic physical activity for 75 minutes/week and exercising 3-5 times per week  Pharmacotherapy was recommended as ordered

## 2018-12-27 NOTE — ASSESSMENT & PLAN NOTE
Take 1 phentermine pill  Daily   Keep food diary, limit calories to 1800 per   Stop smoking while on Phentermine

## 2019-01-16 ENCOUNTER — APPOINTMENT (OUTPATIENT)
Dept: LAB | Facility: CLINIC | Age: 44
End: 2019-01-16
Payer: COMMERCIAL

## 2019-01-16 DIAGNOSIS — E78.2 MIXED HYPERLIPIDEMIA: ICD-10-CM

## 2019-01-16 DIAGNOSIS — R53.83 FATIGUE, UNSPECIFIED TYPE: ICD-10-CM

## 2019-01-16 LAB
ALBUMIN SERPL BCP-MCNC: 3.9 G/DL (ref 3.5–5)
ALP SERPL-CCNC: 74 U/L (ref 46–116)
ALT SERPL W P-5'-P-CCNC: 59 U/L (ref 12–78)
ANION GAP SERPL CALCULATED.3IONS-SCNC: 9 MMOL/L (ref 4–13)
AST SERPL W P-5'-P-CCNC: 27 U/L (ref 5–45)
BASOPHILS # BLD AUTO: 0.05 THOUSANDS/ΜL (ref 0–0.1)
BASOPHILS NFR BLD AUTO: 1 % (ref 0–1)
BILIRUB SERPL-MCNC: 0.51 MG/DL (ref 0.2–1)
BILIRUB UR QL STRIP: NEGATIVE
BUN SERPL-MCNC: 13 MG/DL (ref 5–25)
CALCIUM SERPL-MCNC: 8.9 MG/DL (ref 8.3–10.1)
CHLORIDE SERPL-SCNC: 105 MMOL/L (ref 100–108)
CHOLEST SERPL-MCNC: 185 MG/DL (ref 50–200)
CLARITY UR: NORMAL
CO2 SERPL-SCNC: 26 MMOL/L (ref 21–32)
COLOR UR: YELLOW
CREAT SERPL-MCNC: 0.94 MG/DL (ref 0.6–1.3)
EOSINOPHIL # BLD AUTO: 0.2 THOUSAND/ΜL (ref 0–0.61)
EOSINOPHIL NFR BLD AUTO: 3 % (ref 0–6)
ERYTHROCYTE [DISTWIDTH] IN BLOOD BY AUTOMATED COUNT: 14.3 % (ref 11.6–15.1)
GFR SERPL CREATININE-BSD FRML MDRD: 99 ML/MIN/1.73SQ M
GLUCOSE P FAST SERPL-MCNC: 118 MG/DL (ref 65–99)
GLUCOSE UR STRIP-MCNC: NEGATIVE MG/DL
HCT VFR BLD AUTO: 48.6 % (ref 36.5–49.3)
HDLC SERPL-MCNC: 47 MG/DL (ref 40–60)
HGB BLD-MCNC: 15.8 G/DL (ref 12–17)
HGB UR QL STRIP.AUTO: NEGATIVE
IMM GRANULOCYTES # BLD AUTO: 0.03 THOUSAND/UL (ref 0–0.2)
IMM GRANULOCYTES NFR BLD AUTO: 0 % (ref 0–2)
KETONES UR STRIP-MCNC: NEGATIVE MG/DL
LDLC SERPL CALC-MCNC: 106 MG/DL (ref 0–100)
LEUKOCYTE ESTERASE UR QL STRIP: NEGATIVE
LYMPHOCYTES # BLD AUTO: 2.46 THOUSANDS/ΜL (ref 0.6–4.47)
LYMPHOCYTES NFR BLD AUTO: 33 % (ref 14–44)
MCH RBC QN AUTO: 28.6 PG (ref 26.8–34.3)
MCHC RBC AUTO-ENTMCNC: 32.5 G/DL (ref 31.4–37.4)
MCV RBC AUTO: 88 FL (ref 82–98)
MONOCYTES # BLD AUTO: 0.51 THOUSAND/ΜL (ref 0.17–1.22)
MONOCYTES NFR BLD AUTO: 7 % (ref 4–12)
NEUTROPHILS # BLD AUTO: 4.21 THOUSANDS/ΜL (ref 1.85–7.62)
NEUTS SEG NFR BLD AUTO: 56 % (ref 43–75)
NITRITE UR QL STRIP: NEGATIVE
NONHDLC SERPL-MCNC: 138 MG/DL
NRBC BLD AUTO-RTO: 0 /100 WBCS
PH UR STRIP.AUTO: 5.5 [PH] (ref 4.5–8)
PLATELET # BLD AUTO: 228 THOUSANDS/UL (ref 149–390)
PMV BLD AUTO: 10.8 FL (ref 8.9–12.7)
POTASSIUM SERPL-SCNC: 4.2 MMOL/L (ref 3.5–5.3)
PROT SERPL-MCNC: 7.1 G/DL (ref 6.4–8.2)
PROT UR STRIP-MCNC: NEGATIVE MG/DL
RBC # BLD AUTO: 5.53 MILLION/UL (ref 3.88–5.62)
SODIUM SERPL-SCNC: 140 MMOL/L (ref 136–145)
SP GR UR STRIP.AUTO: 1.03 (ref 1–1.03)
TRIGL SERPL-MCNC: 162 MG/DL
TSH SERPL DL<=0.05 MIU/L-ACNC: 2.52 UIU/ML (ref 0.36–3.74)
UROBILINOGEN UR QL STRIP.AUTO: 0.2 E.U./DL
WBC # BLD AUTO: 7.46 THOUSAND/UL (ref 4.31–10.16)

## 2019-01-16 PROCEDURE — 36415 COLL VENOUS BLD VENIPUNCTURE: CPT

## 2019-01-16 PROCEDURE — 84443 ASSAY THYROID STIM HORMONE: CPT

## 2019-01-16 PROCEDURE — 81003 URINALYSIS AUTO W/O SCOPE: CPT | Performed by: NURSE PRACTITIONER

## 2019-01-16 PROCEDURE — 80053 COMPREHEN METABOLIC PANEL: CPT

## 2019-01-16 PROCEDURE — 80061 LIPID PANEL: CPT

## 2019-01-16 PROCEDURE — 85025 COMPLETE CBC W/AUTO DIFF WBC: CPT

## 2019-02-06 DIAGNOSIS — Z87.891 QUIT SMOKING: ICD-10-CM

## 2019-03-04 ENCOUNTER — OFFICE VISIT (OUTPATIENT)
Dept: URGENT CARE | Facility: CLINIC | Age: 44
End: 2019-03-04
Payer: COMMERCIAL

## 2019-03-04 VITALS
HEIGHT: 67 IN | SYSTOLIC BLOOD PRESSURE: 122 MMHG | WEIGHT: 228 LBS | TEMPERATURE: 97.4 F | BODY MASS INDEX: 35.79 KG/M2 | RESPIRATION RATE: 18 BRPM | OXYGEN SATURATION: 98 % | HEART RATE: 95 BPM | DIASTOLIC BLOOD PRESSURE: 92 MMHG

## 2019-03-04 DIAGNOSIS — R10.11 RIGHT UPPER QUADRANT ABDOMINAL PAIN: ICD-10-CM

## 2019-03-04 DIAGNOSIS — J01.00 ACUTE NON-RECURRENT MAXILLARY SINUSITIS: Primary | ICD-10-CM

## 2019-03-04 PROCEDURE — G0382 LEV 3 HOSP TYPE B ED VISIT: HCPCS | Performed by: PHYSICIAN ASSISTANT

## 2019-03-04 PROCEDURE — 99283 EMERGENCY DEPT VISIT LOW MDM: CPT | Performed by: PHYSICIAN ASSISTANT

## 2019-03-04 PROCEDURE — 99213 OFFICE O/P EST LOW 20 MIN: CPT | Performed by: PHYSICIAN ASSISTANT

## 2019-03-04 RX ORDER — BROMPHENIRAMINE MALEATE, PSEUDOEPHEDRINE HYDROCHLORIDE, AND DEXTROMETHORPHAN HYDROBROMIDE 2; 30; 10 MG/5ML; MG/5ML; MG/5ML
5 SYRUP ORAL 4 TIMES DAILY PRN
Qty: 118 ML | Refills: 0 | Status: SHIPPED | OUTPATIENT
Start: 2019-03-04 | End: 2020-08-09 | Stop reason: ALTCHOICE

## 2019-03-04 RX ORDER — AMOXICILLIN 500 MG/1
500 TABLET, FILM COATED ORAL 3 TIMES DAILY
Qty: 21 TABLET | Refills: 0 | Status: SHIPPED | OUTPATIENT
Start: 2019-03-04 | End: 2019-03-11

## 2019-03-04 NOTE — PATIENT INSTRUCTIONS
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
## 2019-07-28 DIAGNOSIS — K21.9 GASTROESOPHAGEAL REFLUX DISEASE WITHOUT ESOPHAGITIS: ICD-10-CM

## 2019-08-01 RX ORDER — OMEPRAZOLE 40 MG/1
CAPSULE, DELAYED RELEASE ORAL
Qty: 90 CAPSULE | Refills: 0 | Status: SHIPPED | OUTPATIENT
Start: 2019-08-01 | End: 2019-10-26 | Stop reason: SDUPTHER

## 2019-08-16 DIAGNOSIS — E78.2 MIXED HYPERLIPIDEMIA: ICD-10-CM

## 2019-08-19 RX ORDER — ATORVASTATIN CALCIUM 10 MG/1
10 TABLET, FILM COATED ORAL DAILY
Qty: 90 TABLET | Refills: 3 | Status: SHIPPED | OUTPATIENT
Start: 2019-08-19 | End: 2019-10-26 | Stop reason: SDUPTHER

## 2019-10-26 ENCOUNTER — OFFICE VISIT (OUTPATIENT)
Dept: FAMILY MEDICINE CLINIC | Facility: CLINIC | Age: 44
End: 2019-10-26
Payer: COMMERCIAL

## 2019-10-26 VITALS
BODY MASS INDEX: 33.05 KG/M2 | TEMPERATURE: 96.9 F | SYSTOLIC BLOOD PRESSURE: 112 MMHG | HEART RATE: 87 BPM | DIASTOLIC BLOOD PRESSURE: 82 MMHG | WEIGHT: 211 LBS | OXYGEN SATURATION: 97 %

## 2019-10-26 DIAGNOSIS — M43.02 CERVICAL SPONDYLOLYSIS: Primary | ICD-10-CM

## 2019-10-26 DIAGNOSIS — L72.9 SKIN CYST: ICD-10-CM

## 2019-10-26 DIAGNOSIS — E78.2 MIXED HYPERLIPIDEMIA: ICD-10-CM

## 2019-10-26 DIAGNOSIS — K21.9 GASTROESOPHAGEAL REFLUX DISEASE, ESOPHAGITIS PRESENCE NOT SPECIFIED: ICD-10-CM

## 2019-10-26 DIAGNOSIS — Z72.0 TOBACCO USE: ICD-10-CM

## 2019-10-26 DIAGNOSIS — K21.9 GASTROESOPHAGEAL REFLUX DISEASE WITHOUT ESOPHAGITIS: ICD-10-CM

## 2019-10-26 PROBLEM — Z87.891 QUIT SMOKING: Status: RESOLVED | Noted: 2018-12-27 | Resolved: 2019-10-26

## 2019-10-26 PROBLEM — E66.811 OBESITY (BMI 30.0-34.9): Status: RESOLVED | Noted: 2018-12-27 | Resolved: 2019-10-26

## 2019-10-26 PROBLEM — E66.9 OBESITY (BMI 30.0-34.9): Status: RESOLVED | Noted: 2018-12-27 | Resolved: 2019-10-26

## 2019-10-26 PROCEDURE — 99214 OFFICE O/P EST MOD 30 MIN: CPT | Performed by: FAMILY MEDICINE

## 2019-10-26 RX ORDER — OMEPRAZOLE 40 MG/1
40 CAPSULE, DELAYED RELEASE ORAL DAILY
Qty: 90 CAPSULE | Refills: 3 | Status: SHIPPED | OUTPATIENT
Start: 2019-10-26 | End: 2020-01-21

## 2019-10-26 RX ORDER — ATORVASTATIN CALCIUM 10 MG/1
10 TABLET, FILM COATED ORAL DAILY
Qty: 90 TABLET | Refills: 3 | Status: SHIPPED | OUTPATIENT
Start: 2019-10-26 | End: 2020-03-02 | Stop reason: SDUPTHER
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
## 2019-11-01 ENCOUNTER — APPOINTMENT (OUTPATIENT)
Dept: LAB | Facility: CLINIC | Age: 44
End: 2019-11-01
Payer: COMMERCIAL

## 2019-11-01 DIAGNOSIS — E78.2 MIXED HYPERLIPIDEMIA: ICD-10-CM

## 2019-11-01 LAB
ALBUMIN SERPL BCP-MCNC: 3.6 G/DL (ref 3.5–5)
ALP SERPL-CCNC: 81 U/L (ref 46–116)
ALT SERPL W P-5'-P-CCNC: 44 U/L (ref 12–78)
ANION GAP SERPL CALCULATED.3IONS-SCNC: 6 MMOL/L (ref 4–13)
AST SERPL W P-5'-P-CCNC: 18 U/L (ref 5–45)
BILIRUB SERPL-MCNC: 0.36 MG/DL (ref 0.2–1)
BUN SERPL-MCNC: 14 MG/DL (ref 5–25)
CALCIUM SERPL-MCNC: 8.6 MG/DL (ref 8.3–10.1)
CHLORIDE SERPL-SCNC: 109 MMOL/L (ref 100–108)
CHOLEST SERPL-MCNC: 161 MG/DL (ref 50–200)
CO2 SERPL-SCNC: 26 MMOL/L (ref 21–32)
CREAT SERPL-MCNC: 0.85 MG/DL (ref 0.6–1.3)
GFR SERPL CREATININE-BSD FRML MDRD: 106 ML/MIN/1.73SQ M
GLUCOSE P FAST SERPL-MCNC: 90 MG/DL (ref 65–99)
HDLC SERPL-MCNC: 43 MG/DL
LDLC SERPL CALC-MCNC: 97 MG/DL (ref 0–100)
NONHDLC SERPL-MCNC: 118 MG/DL
POTASSIUM SERPL-SCNC: 4.3 MMOL/L (ref 3.5–5.3)
PROT SERPL-MCNC: 6.8 G/DL (ref 6.4–8.2)
SODIUM SERPL-SCNC: 141 MMOL/L (ref 136–145)
TRIGL SERPL-MCNC: 105 MG/DL

## 2019-11-01 PROCEDURE — 80053 COMPREHEN METABOLIC PANEL: CPT

## 2019-11-01 PROCEDURE — 80307 DRUG TEST PRSMV CHEM ANLYZR: CPT | Performed by: FAMILY MEDICINE

## 2019-11-01 PROCEDURE — 80061 LIPID PANEL: CPT

## 2019-11-01 PROCEDURE — 36415 COLL VENOUS BLD VENIPUNCTURE: CPT

## 2019-11-04 ENCOUNTER — TELEPHONE (OUTPATIENT)
Dept: FAMILY MEDICINE CLINIC | Facility: CLINIC | Age: 44
End: 2019-11-04

## 2019-11-04 LAB
AMPHETAMINES UR QL SCN: NEGATIVE NG/ML
BARBITURATES UR QL SCN: NEGATIVE NG/ML
BENZODIAZ UR QL: NEGATIVE NG/ML
BZE UR QL: NEGATIVE NG/ML
CANNABINOIDS UR QL SCN: NEGATIVE NG/ML
METHADONE UR QL SCN: NEGATIVE NG/ML
OPIATES UR QL: NEGATIVE NG/ML
PCP UR QL: NEGATIVE NG/ML
PROPOXYPH UR QL SCN: NEGATIVE NG/ML

## 2019-11-04 NOTE — TELEPHONE ENCOUNTER
----- Message from Mert Ontiveros MD sent at 11/4/2019  8:36 AM EST -----  Call, urine drug screen is negative

## 2020-01-21 DIAGNOSIS — K21.9 GASTROESOPHAGEAL REFLUX DISEASE WITHOUT ESOPHAGITIS: ICD-10-CM

## 2020-01-21 RX ORDER — OMEPRAZOLE 40 MG/1
CAPSULE, DELAYED RELEASE ORAL
Qty: 90 CAPSULE | Refills: 3 | Status: SHIPPED | OUTPATIENT
Start: 2020-01-21 | End: 2020-12-28

## 2020-03-02 DIAGNOSIS — E78.2 MIXED HYPERLIPIDEMIA: ICD-10-CM

## 2020-03-02 RX ORDER — ATORVASTATIN CALCIUM 10 MG/1
10 TABLET, FILM COATED ORAL DAILY
Qty: 90 TABLET | Refills: 3 | Status: SHIPPED | OUTPATIENT
Start: 2020-03-02 | End: 2021-01-04 | Stop reason: SDUPTHER

## 2020-07-13 ENCOUNTER — TELEPHONE (OUTPATIENT)
Dept: FAMILY MEDICINE CLINIC | Facility: CLINIC | Age: 45
End: 2020-07-13

## 2020-07-13 DIAGNOSIS — E78.2 MIXED HYPERLIPIDEMIA: Primary | ICD-10-CM

## 2020-07-13 NOTE — TELEPHONE ENCOUNTER
Patient scheduled an appointment for 8/20/2020 with doctor Claribel Martinez  It is his only day off and he is scheduled for an eye procedure that afternoon  He is requesting orders for his routine blood work to complete prior to the visit  Please enter orders needed

## 2020-08-01 ENCOUNTER — APPOINTMENT (OUTPATIENT)
Dept: LAB | Facility: CLINIC | Age: 45
End: 2020-08-01
Payer: COMMERCIAL

## 2020-08-01 DIAGNOSIS — E78.2 MIXED HYPERLIPIDEMIA: ICD-10-CM

## 2020-08-01 LAB
ALBUMIN SERPL BCP-MCNC: 3.7 G/DL (ref 3.5–5)
ALP SERPL-CCNC: 73 U/L (ref 46–116)
ALT SERPL W P-5'-P-CCNC: 44 U/L (ref 12–78)
ANION GAP SERPL CALCULATED.3IONS-SCNC: 5 MMOL/L (ref 4–13)
AST SERPL W P-5'-P-CCNC: 20 U/L (ref 5–45)
BILIRUB SERPL-MCNC: 0.59 MG/DL (ref 0.2–1)
BUN SERPL-MCNC: 12 MG/DL (ref 5–25)
CALCIUM SERPL-MCNC: 8.7 MG/DL (ref 8.3–10.1)
CHLORIDE SERPL-SCNC: 109 MMOL/L (ref 100–108)
CHOLEST SERPL-MCNC: 172 MG/DL (ref 50–200)
CO2 SERPL-SCNC: 28 MMOL/L (ref 21–32)
CREAT SERPL-MCNC: 0.87 MG/DL (ref 0.6–1.3)
GFR SERPL CREATININE-BSD FRML MDRD: 104 ML/MIN/1.73SQ M
GLUCOSE P FAST SERPL-MCNC: 103 MG/DL (ref 65–99)
HDLC SERPL-MCNC: 45 MG/DL
LDLC SERPL CALC-MCNC: 104 MG/DL (ref 0–100)
NONHDLC SERPL-MCNC: 127 MG/DL
POTASSIUM SERPL-SCNC: 4.3 MMOL/L (ref 3.5–5.3)
PROT SERPL-MCNC: 7.1 G/DL (ref 6.4–8.2)
SODIUM SERPL-SCNC: 142 MMOL/L (ref 136–145)
TRIGL SERPL-MCNC: 116 MG/DL

## 2020-08-01 PROCEDURE — 36415 COLL VENOUS BLD VENIPUNCTURE: CPT

## 2020-08-01 PROCEDURE — 80061 LIPID PANEL: CPT

## 2020-08-01 PROCEDURE — 80053 COMPREHEN METABOLIC PANEL: CPT

## 2020-08-09 ENCOUNTER — OFFICE VISIT (OUTPATIENT)
Dept: URGENT CARE | Facility: CLINIC | Age: 45
End: 2020-08-09
Payer: COMMERCIAL

## 2020-08-09 VITALS
BODY MASS INDEX: 33.43 KG/M2 | SYSTOLIC BLOOD PRESSURE: 114 MMHG | RESPIRATION RATE: 16 BRPM | WEIGHT: 213 LBS | OXYGEN SATURATION: 96 % | HEIGHT: 67 IN | DIASTOLIC BLOOD PRESSURE: 86 MMHG | TEMPERATURE: 98 F | HEART RATE: 81 BPM

## 2020-08-09 DIAGNOSIS — J01.00 ACUTE NON-RECURRENT MAXILLARY SINUSITIS: Primary | ICD-10-CM

## 2020-08-09 PROCEDURE — 99213 OFFICE O/P EST LOW 20 MIN: CPT | Performed by: PHYSICIAN ASSISTANT

## 2020-08-09 RX ORDER — AMOXICILLIN AND CLAVULANATE POTASSIUM 875; 125 MG/1; MG/1
1 TABLET, FILM COATED ORAL EVERY 12 HOURS SCHEDULED
Qty: 14 TABLET | Refills: 0 | Status: SHIPPED | OUTPATIENT
Start: 2020-08-09 | End: 2020-08-16
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
## 2020-08-24 ENCOUNTER — TELEPHONE (OUTPATIENT)
Dept: FAMILY MEDICINE CLINIC | Facility: CLINIC | Age: 45
End: 2020-08-24

## 2020-08-28 ENCOUNTER — OFFICE VISIT (OUTPATIENT)
Dept: URGENT CARE | Facility: CLINIC | Age: 45
End: 2020-08-28
Payer: COMMERCIAL

## 2020-08-28 VITALS
RESPIRATION RATE: 18 BRPM | SYSTOLIC BLOOD PRESSURE: 122 MMHG | TEMPERATURE: 98.7 F | OXYGEN SATURATION: 98 % | DIASTOLIC BLOOD PRESSURE: 72 MMHG | BODY MASS INDEX: 33.74 KG/M2 | HEART RATE: 96 BPM | WEIGHT: 215 LBS | HEIGHT: 67 IN

## 2020-08-28 DIAGNOSIS — J01.90 ACUTE SINUSITIS, RECURRENCE NOT SPECIFIED, UNSPECIFIED LOCATION: Primary | ICD-10-CM

## 2020-08-28 PROCEDURE — 99213 OFFICE O/P EST LOW 20 MIN: CPT | Performed by: PHYSICIAN ASSISTANT

## 2020-08-28 RX ORDER — PREDNISONE 10 MG/1
50 TABLET ORAL DAILY
Qty: 25 TABLET | Refills: 0 | Status: SHIPPED | OUTPATIENT
Start: 2020-08-28 | End: 2020-09-02

## 2020-08-28 RX ORDER — AMOXICILLIN AND CLAVULANATE POTASSIUM 875; 125 MG/1; MG/1
1 TABLET, FILM COATED ORAL EVERY 12 HOURS SCHEDULED
Qty: 20 TABLET | Refills: 0 | Status: SHIPPED | OUTPATIENT
Start: 2020-08-28 | End: 2020-09-07
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
## 2020-09-05 ENCOUNTER — OFFICE VISIT (OUTPATIENT)
Dept: URGENT CARE | Facility: CLINIC | Age: 45
End: 2020-09-05
Payer: COMMERCIAL

## 2020-09-05 VITALS
TEMPERATURE: 97.5 F | HEART RATE: 84 BPM | SYSTOLIC BLOOD PRESSURE: 114 MMHG | HEIGHT: 67 IN | RESPIRATION RATE: 16 BRPM | OXYGEN SATURATION: 100 % | WEIGHT: 214 LBS | DIASTOLIC BLOOD PRESSURE: 86 MMHG | BODY MASS INDEX: 33.59 KG/M2

## 2020-09-05 DIAGNOSIS — T78.40XA ALLERGIC REACTION, INITIAL ENCOUNTER: ICD-10-CM

## 2020-09-05 DIAGNOSIS — S90.562A INSECT BITE OF LEFT ANKLE, INITIAL ENCOUNTER: Primary | ICD-10-CM
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
## 2020-09-05 PROCEDURE — 96372 THER/PROPH/DIAG INJ SC/IM: CPT | Performed by: PHYSICIAN ASSISTANT

## 2020-09-05 PROCEDURE — 99212 OFFICE O/P EST SF 10 MIN: CPT | Performed by: PHYSICIAN ASSISTANT

## 2020-09-05 RX ORDER — METHYLPREDNISOLONE SODIUM SUCCINATE 40 MG/ML
40 INJECTION, POWDER, LYOPHILIZED, FOR SOLUTION INTRAMUSCULAR; INTRAVENOUS ONCE
Status: COMPLETED | OUTPATIENT
Start: 2020-09-05 | End: 2020-09-05

## 2020-09-05 RX ADMIN — METHYLPREDNISOLONE SODIUM SUCCINATE 40 MG: 40 INJECTION, POWDER, LYOPHILIZED, FOR SOLUTION INTRAMUSCULAR; INTRAVENOUS at 08:25

## 2020-09-05 NOTE — PATIENT INSTRUCTIONS
Lip swelling but no other diffuse reaction  Will start solumedrol, he just finished prednisone taper  Continue benadryl 25-50 mg (1-2 tabs) ever 8 hours   If worse swelling/short of breath go to ER
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
## 2020-09-07 VITALS
TEMPERATURE: 98.1 F | SYSTOLIC BLOOD PRESSURE: 129 MMHG | BODY MASS INDEX: 33.74 KG/M2 | OXYGEN SATURATION: 99 % | WEIGHT: 215 LBS | HEIGHT: 67 IN | DIASTOLIC BLOOD PRESSURE: 82 MMHG | HEART RATE: 80 BPM | RESPIRATION RATE: 20 BRPM

## 2020-09-07 PROCEDURE — 99283 EMERGENCY DEPT VISIT LOW MDM: CPT

## 2020-09-08 ENCOUNTER — HOSPITAL ENCOUNTER (EMERGENCY)
Facility: HOSPITAL | Age: 45
Discharge: HOME | End: 2020-09-08
Attending: EMERGENCY MEDICINE | Admitting: EMERGENCY MEDICINE
Payer: COMMERCIAL

## 2020-09-08 DIAGNOSIS — T78.40XA ALLERGIC REACTION, INITIAL ENCOUNTER: Primary | ICD-10-CM

## 2020-09-08 LAB
ATRIAL RATE: 73 BPM
P AXIS: 49 DEGREES
PR INTERVAL: 144 MS
QRS AXIS: 55 DEGREES
QRSD INTERVAL: 76 MS
QT INTERVAL: 380 MS
QTC INTERVAL: 418 MS
T WAVE AXIS: 58 DEGREES
VENTRICULAR RATE: 73 BPM

## 2020-09-08 PROCEDURE — 93005 ELECTROCARDIOGRAM TRACING: CPT

## 2020-09-08 PROCEDURE — 99284 EMERGENCY DEPT VISIT MOD MDM: CPT | Performed by: EMERGENCY MEDICINE

## 2020-09-08 PROCEDURE — 93010 ELECTROCARDIOGRAM REPORT: CPT | Performed by: INTERNAL MEDICINE

## 2020-09-08 RX ORDER — PREDNISONE 1 MG/1
TABLET ORAL
Qty: 21 TABLET | Refills: 0 | Status: SHIPPED | OUTPATIENT
Start: 2020-09-08

## 2020-09-08 RX ORDER — EPINEPHRINE 0.3 MG/.3ML
0.3 INJECTION SUBCUTANEOUS ONCE
Qty: 0.6 ML | Refills: 0 | Status: SHIPPED | OUTPATIENT
Start: 2020-09-08 | End: 2020-09-08

## 2020-09-08 RX ORDER — LIDOCAINE HYDROCHLORIDE 20 MG/ML
15 SOLUTION OROPHARYNGEAL ONCE
Status: COMPLETED | OUTPATIENT
Start: 2020-09-08 | End: 2020-09-08

## 2020-09-08 RX ORDER — PREDNISONE 50 MG/1
50 TABLET ORAL DAILY
Qty: 4 TABLET | Refills: 0 | Status: SHIPPED | OUTPATIENT
Start: 2020-09-09 | End: 2020-09-13

## 2020-09-08 RX ORDER — MAGNESIUM HYDROXIDE/ALUMINUM HYDROXICE/SIMETHICONE 120; 1200; 1200 MG/30ML; MG/30ML; MG/30ML
30 SUSPENSION ORAL ONCE
Status: COMPLETED | OUTPATIENT
Start: 2020-09-08 | End: 2020-09-08

## 2020-09-08 RX ORDER — FAMOTIDINE 20 MG/1
20 TABLET, FILM COATED ORAL ONCE
Status: COMPLETED | OUTPATIENT
Start: 2020-09-08 | End: 2020-09-08

## 2020-09-08 RX ADMIN — FAMOTIDINE 20 MG: 20 TABLET, FILM COATED ORAL at 01:20

## 2020-09-08 RX ADMIN — PREDNISONE 50 MG: 20 TABLET ORAL at 01:20

## 2020-09-08 RX ADMIN — LIDOCAINE HYDROCHLORIDE 15 ML: 20 SOLUTION ORAL; TOPICAL at 01:20

## 2020-09-08 RX ADMIN — ALUMINUM HYDROXIDE, MAGNESIUM HYDROXIDE, AND SIMETHICONE 30 ML: 200; 200; 20 SUSPENSION ORAL at 01:20

## 2020-09-08 NOTE — DISCHARGE INSTRUCTIONS
Please do not take amoxicillin or any other penicillin  Take Benadryl for itching  You may also add on Pepcid once a day as it may help with hives  Finished the steroid course as prescribed  Keep an EpiPen on you, and use as prescribed if needed  Return to the emergency department for any new or worsening symptoms including but not limited to swelling of your throat, difficulty swallowing, difficulty breathing, worsening swelling and rash, fevers, abdominal pains, or any other signs or symptoms of concern
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
## 2020-12-28 DIAGNOSIS — K21.9 GASTROESOPHAGEAL REFLUX DISEASE WITHOUT ESOPHAGITIS: ICD-10-CM

## 2020-12-28 RX ORDER — OMEPRAZOLE 40 MG/1
CAPSULE, DELAYED RELEASE ORAL
Qty: 90 CAPSULE | Refills: 1 | Status: SHIPPED | OUTPATIENT
Start: 2020-12-28

## 2021-01-04 DIAGNOSIS — E78.2 MIXED HYPERLIPIDEMIA: ICD-10-CM

## 2021-01-05 RX ORDER — ATORVASTATIN CALCIUM 10 MG/1
10 TABLET, FILM COATED ORAL DAILY
Qty: 90 TABLET | Refills: 3 | Status: SHIPPED | OUTPATIENT
Start: 2021-01-05

## 2021-09-16 ENCOUNTER — TELEPHONE (OUTPATIENT)
Dept: FAMILY MEDICINE CLINIC | Facility: CLINIC | Age: 46
End: 2021-09-16

## 2021-09-16 NOTE — TELEPHONE ENCOUNTER
Received MRO request from [facility] PCP  Faxed to Centralized Fax Team and scanned into chart     Confirmation fax received

## 2022-04-05 ENCOUNTER — TELEPHONE (OUTPATIENT)
Dept: FAMILY MEDICINE CLINIC | Facility: CLINIC | Age: 47
End: 2022-04-05

## 2025-07-15 ENCOUNTER — TELEPHONE (OUTPATIENT)
Dept: FAMILY MEDICINE CLINIC | Facility: CLINIC | Age: 50
End: 2025-07-15

## 2025-07-16 ENCOUNTER — DOCUMENTATION (OUTPATIENT)
Dept: ADMINISTRATIVE | Facility: OTHER | Age: 50
End: 2025-07-16

## 2025-07-16 NOTE — TELEPHONE ENCOUNTER
07/16/25 6:58 AM     The office's request has been received and reviewed.    The PCP has been successfully removed with a patient attribution note.     This message will now be completed.    Thank you  Jeanne Joseph